# Patient Record
Sex: MALE | Race: WHITE
[De-identification: names, ages, dates, MRNs, and addresses within clinical notes are randomized per-mention and may not be internally consistent; named-entity substitution may affect disease eponyms.]

---

## 2023-02-17 ENCOUNTER — HOSPITAL ENCOUNTER (EMERGENCY)
Dept: HOSPITAL 76 - ED | Age: 28
Discharge: HOME | End: 2023-02-17
Payer: COMMERCIAL

## 2023-02-17 VITALS — SYSTOLIC BLOOD PRESSURE: 140 MMHG | DIASTOLIC BLOOD PRESSURE: 70 MMHG
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
## 2023-02-17 DIAGNOSIS — S01.01XA: Primary | ICD-10-CM
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
## 2023-02-17 PROCEDURE — 12001 RPR S/N/AX/GEN/TRNK 2.5CM/<: CPT

## 2023-02-17 PROCEDURE — 99281 EMR DPT VST MAYX REQ PHY/QHP: CPT

## 2023-02-17 NOTE — ED PHYSICIAN DOCUMENTATION
History of Present Illness





- Stated complaint


Stated Complaint: HEAD INJ/BLEEDING





- Chief complaint


Chief Complaint: Trauma Hd/Nk





- History obtained from


History obtained from: Patient





- History of Present Illness


Timing: Today


Pain level max: 4


Pain level now: 4





- Additonal information


Additional information: 





27-year-old male presents to the emergency department after a grandfather clock 

tipped and struck him on the head today at work.  He works as a .  Tetanus 

up-to-date.  No loss of consciousness.  Mild headache.  No vomiting.  No altered

mental status.  No seizure activity.  No neck or back pain.  No numbness or 

tingling.  Patient is not anticoagulated.





Review of Systems


Constitutional: denies: Fever, Chills


Nose: denies: Rhinorrhea / runny nose, Congestion


Respiratory: denies: Cough


GI: denies: Nausea, Vomiting, Diarrhea


Skin: denies: Rash


Musculoskeletal: denies: Neck pain, Back pain


Neurologic: denies: Headache





PD PAST MEDICAL HISTORY





- Past Medical History


Past Medical History: No





- Past Surgical History


Past Surgical History: No





- Present Medications


Home Medications: 


                                Ambulatory Orders











 Medication  Instructions  Recorded  Confirmed


 


No Known Home Medications  06/22/13 02/17/23














- Allergies


Allergies/Adverse Reactions: 


                                    Allergies











Allergy/AdvReac Type Severity Reaction Status Date / Time


 


No Known Drug Allergies Allergy   Verified 02/17/23 13:48














- Living Situation


Living Situation: reports: With family


Living Arrangement: reports: At home





- Social History


Does the pt smoke?: No


Smoking Status: Never smoker


Does the pt drink ETOH?: No


Does the pt have substance abuse?: No





- Immunizations


Immunizations are current?: No


Immunizations: TDAP >10years/unknown





PD ED PE NORMAL





- Vitals


Vital signs reviewed: Yes





- General


General: Alert and oriented X 3, No acute distress





- HEENT


HEENT: PERRL, EOMI, Moist mucous membranes, Other (2 cm curvilinear laceration 

to the top of the scalp.  No palpable skull fractures.  No active bleeding.  No 

hematomas.  Otherwise normal examination of the head.)





- Neck


Neck: Supple, no meningeal sign, No bony TTP





- Cardiac


Cardiac: RRR, Strong equal pulses





- Respiratory


Respiratory: No respiratory distress, Clear bilaterally





- Abdomen


Abdomen: Soft, Non tender, Non distended





- Back


Back: No spinal TTP





- Derm


Derm: Warm and dry





- Extremities


Extremities: Normal ROM s pain





- Neuro


Neuro: Alert and oriented X 3, CNs 2-12 intact, No motor deficit, No sensory 

deficit, Normal speech





- Psych


Psych: Normal mood, Normal affect





Results





- Vitals


Vitals: 


                               Vital Signs - 24 hr











  02/17/23





  13:48


 


Temperature 36.6 C


 


Heart Rate 80


 


Respiratory 16





Rate 


 


Blood Pressure 140/70 H


 


O2 Saturation 99








                                     Oxygen











O2 Source                      Room air

















Procedures





- Laceration (location)


  ** scalp


Length in cm: 2


Wound type: Curved, Into subcut fat, Clean


Neurovascular status: Sensory intact, Motor intact, Vascular intact


Wound preparation: Irrigated copiously NS


Skin layer closure: Staples


Other: Patient tolerated well, No complications, Neurovascular intact, Tetanus 

UTD





PD Medical Decision Making





- ED course


Complexity details: considered differential, d/w patient


ED course: 





Laceration repaired with staples.  Tolerated well.  Tetanus up-to-date.  No 

indication for head CT.  Head injury instructions given at bedside.  GCS 15.  No

neck tenderness or pain.  No neurological deficits.  Patient counseled regarding

signs and symptoms for which I believe and urgent re-evaluation would be 

necessary. Patient with good understanding of and agreement to plan and is 

comfortable going home at this time





This document was made in part using voice recognition software. While efforts 

are made to proofread this document, sound alike and grammatical errors may 

occur.





Departure





- Departure


Disposition: 01 Home, Self Care


Clinical Impression: 


Scalp laceration


Qualifiers:


 Encounter type: initial encounter Qualified Code(s): S01.01XA - Laceration 

without foreign body of scalp, initial encounter





Condition: Good


Instructions:  ED Head Injury Closed, ED Laceration Scalp Stitch Or Stap


Follow-Up: 


your,doctor in 10-14 days for staple removal [Other]


 [facility] [Provider Group]


Comments: 


Please follow-up with your doctor for further care.  You should have the staples

removed in about 10 to 14 days.  Please return for worsening headaches, 

vomiting, changes in mental status, seizures or any other new or worrisome 

symptoms.  Keep the wound clean.